# Patient Record
Sex: MALE | Race: OTHER | URBAN - METROPOLITAN AREA
[De-identification: names, ages, dates, MRNs, and addresses within clinical notes are randomized per-mention and may not be internally consistent; named-entity substitution may affect disease eponyms.]

---

## 2017-02-08 NOTE — PROCEDURE NOTE: CLINICAL
PROCEDURE NOTE: Focal Laser, Retina OS. Diagnosis: Diabetic Macular Edema. Anesthesia: Topical. Prior to focal laser, risks/benefits/alternatives to laser discussed including loss of vision and patient wished to proceed. An informed consent was obtained and no assurances or guarantees were given. Spot size: 50 um. Power: 70 mW. Number of pulses: 20. Pulse duration: 50 ms. Procedure Time: 3:37pm. Patient tolerated procedure well. There were no complications. Post procedure instructions given. Patient given office phone number/answering service number and advised to call immediately should there be loss of vision or pain, or should they have any other questions or concerns. Sri Wells

## 2017-02-16 NOTE — PROCEDURE NOTE: CLINICAL
PROCEDURE NOTE: Avastin 1.25mg OD. Diagnosis: Diabetic Macular Edema. Anesthesia: Akten Gel 3.5%. Prep: Betadine Drops. Prior to injection, risks/benefits/alternatives discussed including infection, loss of vision, hemorrhage, cataract, glaucoma, retinal tears or detachment. The off-label status of Intravitreal Avastin also was reviewed. The patient wished to proceed with treatment. Topical anesthesia was induced with Alcaine. Additional anesthesia was achieved using drop(s) or injection checked above. A drop of Povidone-iodine 5% ophthalmic solution was instilled over the injection site and in the inferior fornix. Betadine prep was performed. Using the syringe provided, Avastin 1.25 mg in 0.05 cc was injected into the vitreous cavity. The needle was passed 3.0 mm posterior to the limbus in pseudophakic patients, and 3.5 mm posterior to the limbus in phakic patients. Patient tolerated procedure well. There were no complications. Injection time: 1050. Lot #: [email or web address]. Expiration Date: 3/12/2017. Inventory Id: null. Post-op instructions given. The patient was instructed to return for re-evaluation in approximately 4-12 weeks depending on his/her condition and was told to call immediately if vision decreases and/or if his/her eye becomes red, painful, and/or light sensitive. The patient was instructed to go to the emergency room or call 911 if unable to reach the doctor within an hour or two of trying or calling. The patient was instructed to use Artificial Tears q.i.d. p.r.n for comfort. Va Williamson

## 2017-03-30 NOTE — PROCEDURE NOTE: CLINICAL
PROCEDURE NOTE: Avastin 1.25mg OD. Diagnosis: Diabetic Macular Edema. Anesthesia: Topical. Prep: Betadine Drops. Prior to injection, risks/benefits/alternatives discussed including infection, loss of vision, hemorrhage, cataract, glaucoma, retinal tears or detachment. The off-label status of Intravitreal Avastin also was reviewed. The patient wished to proceed with treatment. Topical anesthesia was induced with Alcaine. Additional anesthesia was achieved using drop(s) or injection checked above. A drop of Povidone-iodine 5% ophthalmic solution was instilled over the injection site and in the inferior fornix. Betadine prep was performed. Using the syringe provided, Avastin 1.25 mg in 0.05 cc was injected into the vitreous cavity. The needle was passed 3.0 mm posterior to the limbus in pseudophakic patients, and 3.5 mm posterior to the limbus in phakic patients. Patient tolerated procedure well. There were no complications. Injection time: *. Lot #: [email or web address]. Expiration Date: 5/7/2017. Inventory Id: null. Post-op instructions given. The patient was instructed to return for re-evaluation in approximately 4-12 weeks depending on his/her condition and was told to call immediately if vision decreases and/or if his/her eye becomes red, painful, and/or light sensitive. The patient was instructed to go to the emergency room or call 911 if unable to reach the doctor within an hour or two of trying or calling. The patient was instructed to use Artificial Tears q.i.d. p.r.n for comfort. Samanta Arce

## 2017-05-25 NOTE — PATIENT DISCUSSION
EYLEA AND REPREAT IN 7 WKS - MOD EDEMA - INCREASING- AND VA DROPPED TO  20/40 AT 8 WKS S/P LAST AVASTIN TREATMENT - TO TRY CHANGING TO EYLEA AND TO REDUCE F/U TO 7 WKS.

## 2017-05-31 NOTE — PROCEDURE NOTE: CLINICAL
PROCEDURE NOTE: Eylea 2mg OS. Diagnosis: Diabetic Macular Edema. Anesthesia: Akten Gel 3.5%. Prep: Betadine Drops. Prior to injection, risks/benefits/alternatives discussed including infection, loss of vision, hemorrhage, cataract, glaucoma, retinal tears or detachment. The patient wished to proceed with treatment. Betadine prep was performed. Topical anesthesia was induced with Alcaine. Additional anesthesia was achieved using drop(s) or injection checked above. A drop of Povidone-iodine 5% ophthalmic solution was instilled over the injection site and in the inferior fornix. Using the syringe provided, Eylea 2.0mg in 0.05 cc was injected into the vitreous cavity. The remainder of the Eylea in the single-use vial was then discarded in a medical waste disposal container. The needle was passed 3.0 mm posterior to the limbus in pseudophakic patients, and 3.5 mm posterior to the limbus in phakic patients. Patient tolerated procedure well. There were no complications. Injection time: *. The eye was irrigated with sterile irrigating solution. Post procedure instructions given. The patient was instructed to return for re-evaluation in approximately 4-12 weeks depending on his/her condition and was told to call immediately if vision decreases and/or if his/her eye becomes red, painful, and/or light sensitive. The patient was instructed to go to the emergency room or call 911 if unable to reach the doctor within an hour or two of trying or calling. The patient was instructed to use Artificial Tears q.i.d. p.r.n for comfort. [place]

## 2022-04-22 ENCOUNTER — NEW PATIENT (OUTPATIENT)
Dept: URBAN - METROPOLITAN AREA CLINIC 14 | Facility: CLINIC | Age: 67
End: 2022-04-22

## 2022-04-22 DIAGNOSIS — D23.112: ICD-10-CM

## 2022-04-22 PROCEDURE — 99202 OFFICE O/P NEW SF 15 MIN: CPT

## 2022-04-22 PROCEDURE — 92285 EXTERNAL OCULAR PHOTOGRAPHY: CPT

## 2022-04-22 PROCEDURE — 67840 REMOVE EYELID LESION: CPT

## 2022-04-22 ASSESSMENT — VISUAL ACUITY
OS_SC: 20/20
OD_SC: 20/20

## 2022-06-21 RX ORDER — DICLOFENAC SODIUM 75 MG/1
TABLET, DELAYED RELEASE ORAL
COMMUNITY
Start: 2021-06-11

## 2022-06-21 RX ORDER — OLMESARTAN MEDOXOMIL 20 MG/1
TABLET ORAL
COMMUNITY

## 2023-01-26 ENCOUNTER — ESTABLISHED PATIENT (OUTPATIENT)
Dept: URBAN - METROPOLITAN AREA CLINIC 14 | Facility: CLINIC | Age: 68
End: 2023-01-26

## 2023-01-26 DIAGNOSIS — H25.13: ICD-10-CM

## 2023-01-26 DIAGNOSIS — H25.013: ICD-10-CM

## 2023-01-26 DIAGNOSIS — H34.8122: ICD-10-CM

## 2023-01-26 DIAGNOSIS — H02.421: ICD-10-CM

## 2023-01-26 DIAGNOSIS — H40.013: ICD-10-CM

## 2023-01-26 DIAGNOSIS — D23.112: ICD-10-CM

## 2023-01-26 PROCEDURE — 92285 EXTERNAL OCULAR PHOTOGRAPHY: CPT

## 2023-01-26 PROCEDURE — 92020 GONIOSCOPY: CPT

## 2023-01-26 PROCEDURE — 67840 REMOVE EYELID LESION: CPT

## 2023-01-26 PROCEDURE — 99214 OFFICE O/P EST MOD 30 MIN: CPT

## 2023-01-26 ASSESSMENT — TONOMETRY
OS_IOP_MMHG: 17
OD_IOP_MMHG: 13

## 2023-01-26 ASSESSMENT — VISUAL ACUITY
OS_SC: 20/40-1
OD_SC: 20/40-2